# Patient Record
Sex: FEMALE | Race: WHITE
[De-identification: names, ages, dates, MRNs, and addresses within clinical notes are randomized per-mention and may not be internally consistent; named-entity substitution may affect disease eponyms.]

---

## 2023-10-31 ENCOUNTER — DASHBOARD ENCOUNTERS (OUTPATIENT)
Age: 78
End: 2023-10-31

## 2023-10-31 ENCOUNTER — OFFICE VISIT (OUTPATIENT)
Dept: URBAN - METROPOLITAN AREA CLINIC 80 | Facility: CLINIC | Age: 78
End: 2023-10-31
Payer: MEDICARE

## 2023-10-31 VITALS — BODY MASS INDEX: 32.98 KG/M2 | TEMPERATURE: 97.8 F | HEIGHT: 66 IN | WEIGHT: 205.2 LBS

## 2023-10-31 DIAGNOSIS — R07.89 OTHER CHEST PAIN: ICD-10-CM

## 2023-10-31 PROCEDURE — 99203 OFFICE O/P NEW LOW 30 MIN: CPT | Performed by: INTERNAL MEDICINE

## 2023-10-31 RX ORDER — FLUTICASONE PROPIONATE 50 UG/1
SPRAY, METERED NASAL
Qty: 16 | Status: ACTIVE | COMMUNITY

## 2023-10-31 RX ORDER — RISANKIZUMAB-RZAA 150 MG/ML
INJECTION SUBCUTANEOUS
Qty: 1 UNSPECIFIED | Status: ACTIVE | COMMUNITY

## 2023-10-31 RX ORDER — MULTIVIT-MIN/IRON/FOLIC ACID/K 18-600-40
AS DIRECTED CAPSULE ORAL
Status: ACTIVE | COMMUNITY
Start: 2023-10-31

## 2023-10-31 RX ORDER — PHENOBARBITAL, HYOSCYAMINE SULFATE, ATROPINE SULFATE, SCOPOLAMINE HYDROBROMIDE 16.2; .1037; .0194; .0065 MG/5ML; MG/5ML; MG/5ML; MG/5ML
5 ML ELIXIR ORAL
Qty: 450 MILLILITER | Refills: 1 | OUTPATIENT
Start: 2023-10-31 | End: 2023-12-29

## 2023-10-31 RX ORDER — MECOBALAMIN 5000 MCG
AS DIRECTED LOZENGE ORAL
Status: ACTIVE | COMMUNITY
Start: 2023-10-31

## 2023-10-31 RX ORDER — OMEPRAZOLE 40 MG/1
1 CAPSULE 30 MINUTES BEFORE MORNING MEAL CAPSULE, DELAYED RELEASE ORAL ONCE A DAY
Qty: 30 | OUTPATIENT
Start: 2023-10-31

## 2023-10-31 RX ORDER — SUCRALFATE ORAL 1 G/10ML
SUSPENSION ORAL
Qty: 420 UNSPECIFIED | Status: ACTIVE | COMMUNITY

## 2023-10-31 NOTE — HPI-TODAY'S VISIT:
Went into [facility] with Chest pain: was it Cards or reflux?   Echo, EKG, labs unremarkable. Stress normal. Ddimer. CT neg for PE.   Feeling a whole lot better in terms of any pain, now on sucralfate, was also previously taking protonix.  Does note she is extremely exhausted and lightheaded. Weighted down. Quit taking PPI. Feels better when off the protonix- this was started in the hosptial but felt she needed to stop it.   Does have excessive gas. When swallows it hurts, a little painful.  Treamendous stress.

## 2023-11-03 ENCOUNTER — P2P PATIENT RECORD (OUTPATIENT)
Age: 78
End: 2023-11-03

## 2023-11-20 ENCOUNTER — OFFICE VISIT (OUTPATIENT)
Dept: URBAN - METROPOLITAN AREA SURGERY CENTER 19 | Facility: SURGERY CENTER | Age: 78
End: 2023-11-20

## 2023-12-01 ENCOUNTER — OFFICE VISIT (OUTPATIENT)
Dept: URBAN - METROPOLITAN AREA SURGERY CENTER 19 | Facility: SURGERY CENTER | Age: 78
End: 2023-12-01

## 2023-12-06 ENCOUNTER — OFFICE VISIT (OUTPATIENT)
Dept: URBAN - METROPOLITAN AREA SURGERY CENTER 19 | Facility: SURGERY CENTER | Age: 78
End: 2023-12-06

## 2023-12-15 ENCOUNTER — OFFICE VISIT (OUTPATIENT)
Dept: URBAN - METROPOLITAN AREA SURGERY CENTER 19 | Facility: SURGERY CENTER | Age: 78
End: 2023-12-15

## 2023-12-15 ENCOUNTER — TELEPHONE ENCOUNTER (OUTPATIENT)
Dept: URBAN - METROPOLITAN AREA CLINIC 80 | Facility: CLINIC | Age: 78
End: 2023-12-15

## 2023-12-15 RX ORDER — OMEPRAZOLE 40 MG/1
1 CAPSULE 30 MINUTES BEFORE MORNING MEAL CAPSULE, DELAYED RELEASE ORAL ONCE A DAY
Qty: 90 | Refills: 0
Start: 2023-10-31

## 2024-03-22 ENCOUNTER — EGD (OUTPATIENT)
Dept: URBAN - METROPOLITAN AREA SURGERY CENTER 19 | Facility: SURGERY CENTER | Age: 79
End: 2024-03-22

## 2024-10-22 ENCOUNTER — OFFICE VISIT (OUTPATIENT)
Dept: URBAN - METROPOLITAN AREA CLINIC 80 | Facility: CLINIC | Age: 79
End: 2024-10-22